# Patient Record
Sex: MALE | Race: WHITE | NOT HISPANIC OR LATINO | Employment: OTHER | URBAN - METROPOLITAN AREA
[De-identification: names, ages, dates, MRNs, and addresses within clinical notes are randomized per-mention and may not be internally consistent; named-entity substitution may affect disease eponyms.]

---

## 2024-02-07 ENCOUNTER — OFFICE VISIT (OUTPATIENT)
Dept: OTOLARYNGOLOGY | Facility: CLINIC | Age: 79
End: 2024-02-07
Payer: MEDICARE

## 2024-02-07 VITALS — WEIGHT: 232 LBS | HEIGHT: 75 IN | TEMPERATURE: 96.8 F | BODY MASS INDEX: 28.85 KG/M2

## 2024-02-07 DIAGNOSIS — H61.23 HEARING LOSS OF BOTH EARS DUE TO CERUMEN IMPACTION: ICD-10-CM

## 2024-02-07 DIAGNOSIS — H61.23 BILATERAL IMPACTED CERUMEN: Primary | ICD-10-CM

## 2024-02-07 DIAGNOSIS — H90.3 SENSORINEURAL HEARING LOSS (SNHL) OF BOTH EARS: ICD-10-CM

## 2024-02-07 PROCEDURE — 99214 OFFICE O/P EST MOD 30 MIN: CPT | Performed by: OTOLARYNGOLOGY

## 2024-02-07 PROCEDURE — 1036F TOBACCO NON-USER: CPT | Performed by: OTOLARYNGOLOGY

## 2024-02-07 PROCEDURE — 1159F MED LIST DOCD IN RCRD: CPT | Performed by: OTOLARYNGOLOGY

## 2024-02-07 RX ORDER — ATORVASTATIN CALCIUM 80 MG/1
1 TABLET, FILM COATED ORAL NIGHTLY
COMMUNITY
Start: 2023-12-26

## 2024-02-07 RX ORDER — DORZOLAMIDE HYDROCHLORIDE AND TIMOLOL MALEATE 20; 5 MG/ML; MG/ML
1 SOLUTION/ DROPS OPHTHALMIC 2 TIMES DAILY
COMMUNITY

## 2024-02-07 RX ORDER — ASPIRIN 81 MG/1
81 TABLET ORAL DAILY
COMMUNITY

## 2024-02-07 RX ORDER — METOPROLOL SUCCINATE 25 MG/1
25 TABLET, EXTENDED RELEASE ORAL EVERY 12 HOURS
COMMUNITY
Start: 2023-11-07

## 2024-02-07 RX ORDER — BRIMONIDINE TARTRATE 1.5 MG/ML
SOLUTION/ DROPS OPHTHALMIC
COMMUNITY

## 2024-02-07 RX ORDER — PANTOPRAZOLE SODIUM 20 MG/1
20 TABLET, DELAYED RELEASE ORAL
COMMUNITY
Start: 2023-11-29 | End: 2024-11-28

## 2024-02-07 RX ORDER — LATANOPROST 50 UG/ML
1 SOLUTION/ DROPS OPHTHALMIC
COMMUNITY
Start: 2023-03-07 | End: 2024-03-06

## 2024-02-07 NOTE — PROGRESS NOTES
"Chief Complaint   Patient presents with    Follow-up     LOV 8/23 EAR CLEANING, IRRIGATED      Date of Evaluation: 2/7/2024   ISABELLE Tamayo is a 78 y.o. male is here for semiannual ear cleaning. The ears feel plugged. He ishad open heart surgery/CABG  History: with a history of recurrent cerumen impactions. In February 2019 he had a right craniotomy and evacuation of a subdural hematoma related to anticoagulation therapy. Since then he has had left-sided pulsatile tinnitus and hearing loss. His previous audiogram shows mild asymmetric moderate high-frequency sensorineural hearing loss       Past Medical History:   Diagnosis Date    Coronary artery disease     Personal history of other diseases of the circulatory system     History of hypertension    Personal history of other diseases of the digestive system     History of gastroesophageal reflux (GERD)    Personal history of other diseases of the respiratory system     History of asthma      Past Surgical History:   Procedure Laterality Date    CARDIAC SURGERY      OTHER SURGICAL HISTORY  01/21/2021    Cataract surgery    OTHER SURGICAL HISTORY  01/21/2021    Craniotomy    OTHER SURGICAL HISTORY  01/21/2021    Knee surgery    OTHER SURGICAL HISTORY  02/02/2022    Rotator cuff repair          Medications:   Current Outpatient Medications   Medication Instructions    aspirin 81 mg, oral, Daily    atorvastatin (Lipitor) 80 mg tablet 1 tablet, oral, Nightly    brimonidine (AlphaGAN P) 0.15 % ophthalmic solution ophthalmic (eye)    dorzolamide-timoloL (Cosopt) 22.3-6.8 mg/mL ophthalmic solution 1 drop, ophthalmic (eye), 2 times daily    latanoprost (Xalatan) 0.005 % ophthalmic solution 1 drop, ophthalmic (eye)    metoprolol succinate XL (TOPROL-XL) 25 mg, oral, Every 12 hours    pantoprazole (PROTONIX) 20 mg, oral        Allergies:  No Known Allergies     Physical Exam:  Last Recorded Vitals  Temperature 36 °C (96.8 °F), height 1.905 m (6' 3\"), weight 105 kg (232 " lb).  []General appearance: Well-developed, well-nourished in no acute distress, conversant with normal voice quality    Head/face: No erythema or edema or facial tenderness, and normal facial nerve function bilaterally    External ear: Clear external auditory canals with normal pinnae bilateral large cerumen impactions obstructing the entire ear canal.  Removed with curettes under microscope  Tube status: N/A  Middle ear: Tympanic membranes intact and mobile, middle ears normal.  Tympanic membrane perforation: N/A  Mastoid bowl: N/A  Hearing: Normal conversational awareness at normal speech thresholds    Nose visualized using: Anterior rhinoscopy  Nasal dorsum: Nontraumatic midline appearance  Septum: Midline, nonobstructing  Inferior turbinates: Normal, pink  Secretions: Dry    Oral cavity and oropharynx: Normal  Teeth: Good condition  Floor of mouth: without lesions  Palate: Normal hard palate, soft palate and uvula  Oropharynx: Clear, no lesions present  Buccal mucosa: Normal without masses or lesions  Lips: Normal    Nasopharynx: Inadequate mirror exam secondary to gag/anatomy    Neck:  Salivary glands: Normal bilateral parotid and submandibular glands by inspection and palpation.  Non-thyroid masses: No palpable masses or significant lymphadenopathy  Trachea: Midline  Thyroid: No thyromegaly or palpable nodules  Temporomandibular joint: Nontender  Cervical range of motion: Normal    Neurologic exam: Alert and oriented x3, appropriate affect.  Cranial nerves II-XII normal bilaterally  Extraocular movement: Extraocular movement intact, normal gaze alignment        Juancarlos was seen today for follow-up.  Diagnoses and all orders for this visit:  Bilateral impacted cerumen (Primary)  Hearing loss of both ears due to cerumen impaction  Sensorineural hearing loss (SNHL) of both ears       PLAN  Recheck in 6 months.  Ears were cleaned and he is hearing better    Germain Frias MD

## 2024-08-02 ENCOUNTER — APPOINTMENT (OUTPATIENT)
Dept: OTOLARYNGOLOGY | Facility: CLINIC | Age: 79
End: 2024-08-02
Payer: MEDICARE

## 2024-08-09 ENCOUNTER — APPOINTMENT (OUTPATIENT)
Dept: OTOLARYNGOLOGY | Facility: CLINIC | Age: 79
End: 2024-08-09
Payer: MEDICARE

## 2024-08-16 ENCOUNTER — APPOINTMENT (OUTPATIENT)
Dept: OTOLARYNGOLOGY | Facility: CLINIC | Age: 79
End: 2024-08-16
Payer: MEDICARE

## 2024-08-16 VITALS — HEIGHT: 75 IN | BODY MASS INDEX: 29.97 KG/M2 | WEIGHT: 241 LBS | TEMPERATURE: 95.9 F

## 2024-08-16 DIAGNOSIS — H90.3 SENSORINEURAL HEARING LOSS (SNHL) OF BOTH EARS: ICD-10-CM

## 2024-08-16 DIAGNOSIS — H61.23 BILATERAL IMPACTED CERUMEN: Primary | ICD-10-CM

## 2024-08-16 DIAGNOSIS — H61.23 HEARING LOSS OF BOTH EARS DUE TO CERUMEN IMPACTION: ICD-10-CM

## 2024-08-16 PROCEDURE — 1036F TOBACCO NON-USER: CPT | Performed by: OTOLARYNGOLOGY

## 2024-08-16 PROCEDURE — 99213 OFFICE O/P EST LOW 20 MIN: CPT | Performed by: OTOLARYNGOLOGY

## 2024-08-16 PROCEDURE — 1159F MED LIST DOCD IN RCRD: CPT | Performed by: OTOLARYNGOLOGY

## 2024-08-16 RX ORDER — ROSUVASTATIN CALCIUM 20 MG/1
20 TABLET, COATED ORAL DAILY
COMMUNITY

## 2024-08-16 NOTE — PROGRESS NOTES
"Chief Complaint   Patient presents with    Follow-up     EP- 6 MO EAR CK, IRRIGATED TODAY       Date of Evaluation: 8/16/2024   HPI  Juancarlos Tamayo \"Uday\" is a 79 y.o. male is here for semiannual ear cleaning. The ears feel plugged. He had open heart surgery/CABG  History: with a history of recurrent cerumen impactions. In February 2019 he had a right craniotomy and evacuation of a subdural hematoma related to anticoagulation therapy. Since then he has had left-sided pulsatile tinnitus and hearing loss. His previous audiogram shows mild asymmetric moderate high-frequency sensorineural hearing loss       Past Medical History:   Diagnosis Date    Coronary artery disease     Personal history of other diseases of the circulatory system     History of hypertension    Personal history of other diseases of the digestive system     History of gastroesophageal reflux (GERD)    Personal history of other diseases of the respiratory system     History of asthma      Past Surgical History:   Procedure Laterality Date    CARDIAC SURGERY      OTHER SURGICAL HISTORY  01/21/2021    Cataract surgery    OTHER SURGICAL HISTORY  01/21/2021    Craniotomy    OTHER SURGICAL HISTORY  01/21/2021    Knee surgery    OTHER SURGICAL HISTORY  02/02/2022    Rotator cuff repair          Medications:   Current Outpatient Medications   Medication Instructions    aspirin 81 mg, oral, Daily    atorvastatin (Lipitor) 80 mg tablet 1 tablet, oral, Nightly    brimonidine (AlphaGAN P) 0.15 % ophthalmic solution ophthalmic (eye)    dorzolamide-timoloL (Cosopt) 22.3-6.8 mg/mL ophthalmic solution 1 drop, ophthalmic (eye), 2 times daily    metoprolol succinate XL (TOPROL-XL) 25 mg, oral, Every 12 hours    pantoprazole (PROTONIX) 20 mg, oral    rosuvastatin (CRESTOR) 20 mg, oral, Daily        Allergies:  Allergies   Allergen Reactions    Clobetasol Hives     Clobetasol propionate        Physical Exam:  Last Recorded Vitals  Temperature 35.5 °C (95.9 °F), height " "1.905 m (6' 3\"), weight 109 kg (241 lb).  []General appearance: Well-developed, well-nourished in no acute distress, conversant with normal voice quality    Head/face: No erythema or edema or facial tenderness, and normal facial nerve function bilaterally    External ear: Clear external auditory canals with normal pinnae bilateral large cerumen impactions obstructing the entire ear canal.  Removed with curettes under microscope  Tube status: N/A  Middle ear: Tympanic membranes intact and mobile, middle ears normal.  Tympanic membrane perforation: N/A  Mastoid bowl: N/A  Hearing: Normal conversational awareness at normal speech thresholds    Nose visualized using: Anterior rhinoscopy  Nasal dorsum: Nontraumatic midline appearance  Septum: Midline, nonobstructing  Inferior turbinates: Normal, pink  Secretions: Dry    Oral cavity and oropharynx: Normal  Teeth: Good condition  Floor of mouth: without lesions  Palate: Normal hard palate, soft palate and uvula  Oropharynx: Clear, no lesions present  Buccal mucosa: Normal without masses or lesions  Lips: Normal    Nasopharynx: Inadequate mirror exam secondary to gag/anatomy    Neck:  Salivary glands: Normal bilateral parotid and submandibular glands by inspection and palpation.  Non-thyroid masses: No palpable masses or significant lymphadenopathy  Trachea: Midline  Thyroid: No thyromegaly or palpable nodules  Temporomandibular joint: Nontender  Cervical range of motion: Normal    Neurologic exam: Alert and oriented x3, appropriate affect.  Cranial nerves II-XII normal bilaterally  Extraocular movement: Extraocular movement intact, normal gaze alignment        Juancarlos \"Uday\" was seen today for follow-up.  Diagnoses and all orders for this visit:  Bilateral impacted cerumen (Primary)  Hearing loss of both ears due to cerumen impaction  Sensorineural hearing loss (SNHL) of both ears         PLAN  Recheck in 6 months.  Ears were cleaned and he is hearing better    Germain J " MD Rodriguez

## 2025-02-17 ENCOUNTER — APPOINTMENT (OUTPATIENT)
Dept: OTOLARYNGOLOGY | Facility: CLINIC | Age: 80
End: 2025-02-17
Payer: MEDICARE

## 2025-02-17 VITALS — TEMPERATURE: 96.1 F | HEIGHT: 75 IN | WEIGHT: 240 LBS | BODY MASS INDEX: 29.84 KG/M2

## 2025-02-17 DIAGNOSIS — H90.3 SENSORINEURAL HEARING LOSS (SNHL) OF BOTH EARS: ICD-10-CM

## 2025-02-17 DIAGNOSIS — H61.23 BILATERAL IMPACTED CERUMEN: Primary | ICD-10-CM

## 2025-02-17 DIAGNOSIS — H61.23 HEARING LOSS OF BOTH EARS DUE TO CERUMEN IMPACTION: ICD-10-CM

## 2025-02-17 PROCEDURE — 1036F TOBACCO NON-USER: CPT | Performed by: OTOLARYNGOLOGY

## 2025-02-17 PROCEDURE — 1159F MED LIST DOCD IN RCRD: CPT | Performed by: OTOLARYNGOLOGY

## 2025-02-17 PROCEDURE — 99213 OFFICE O/P EST LOW 20 MIN: CPT | Performed by: OTOLARYNGOLOGY

## 2025-02-17 RX ORDER — METFORMIN HYDROCHLORIDE 500 MG/1
1000 TABLET, EXTENDED RELEASE ORAL
COMMUNITY

## 2025-02-17 RX ORDER — VIT C/E/ZN/COPPR/LUTEIN/ZEAXAN 250MG-90MG
2000 CAPSULE ORAL
COMMUNITY

## 2025-02-17 RX ORDER — LATANOPROST 50 UG/ML
1 SOLUTION/ DROPS OPHTHALMIC
COMMUNITY
Start: 2024-04-18 | End: 2025-04-18

## 2025-02-17 NOTE — PROGRESS NOTES
"Chief Complaint   Patient presents with    Follow-up     LOV 8/24 EAR CLEANING      Date of Evaluation: 2/17/2025   [facility]  Juancarlos Tamayo \"Uday\" is a 79 y.o. male is here for semiannual ear cleaning. The ears feel plugged. He had open heart surgery/CABG  History: with a history of recurrent cerumen impactions. In February 2019 he had a right craniotomy and evacuation of a subdural hematoma related to anticoagulation therapy. Since then he has had left-sided pulsatile tinnitus and hearing loss. His previous audiogram shows mild asymmetric moderate high-frequency sensorineural hearing loss       Past Medical History:   Diagnosis Date    Coronary artery disease     Personal history of other diseases of the circulatory system     History of hypertension    Personal history of other diseases of the digestive system     History of gastroesophageal reflux (GERD)    Personal history of other diseases of the respiratory system     History of asthma      Past Surgical History:   Procedure Laterality Date    CARDIAC SURGERY      OTHER SURGICAL HISTORY  01/21/2021    Cataract surgery    OTHER SURGICAL HISTORY  01/21/2021    Craniotomy    OTHER SURGICAL HISTORY  01/21/2021    Knee surgery    OTHER SURGICAL HISTORY  02/02/2022    Rotator cuff repair          Medications:   Current Outpatient Medications   Medication Instructions    aspirin 81 mg, Daily    atorvastatin (Lipitor) 80 mg tablet 1 tablet, Nightly    brimonidine (AlphaGAN P) 0.15 % ophthalmic solution Administer into affected eye(s).    cholecalciferol (VITAMIN D-3) 2,000 Units, Daily RT    dorzolamide-timoloL (Cosopt) 22.3-6.8 mg/mL ophthalmic solution 1 drop, 2 times daily    latanoprost (Xalatan) 0.005 % ophthalmic solution 1 drop    metFORMIN XR (GLUCOPHAGE-XR) 1,000 mg, Daily with breakfast    metoprolol succinate XL (TOPROL-XL) 25 mg, Every 12 hours    pantoprazole (PROTONIX) 20 mg, oral    rosuvastatin (CRESTOR) 20 mg, Daily        Allergies:  Allergies   Allergen " "Reactions    Clobetasol Hives     Clobetasol propionate        Physical Exam:  Last Recorded Vitals  Temperature 35.6 °C (96.1 °F), height 1.905 m (6' 3\"), weight 109 kg (240 lb).  []General appearance: Well-developed, well-nourished in no acute distress, conversant with normal voice quality    Head/face: No erythema or edema or facial tenderness, and normal facial nerve function bilaterally    External ear: Clear external auditory canals with normal pinnae bilateral large cerumen impactions obstructing the entire ear canal.  Removed with curettes under microscope  Tube status: N/A  Middle ear: Tympanic membranes intact and mobile, middle ears normal.  Tympanic membrane perforation: N/A  Mastoid bowl: N/A  Hearing: Normal conversational awareness at normal speech thresholds    Nose visualized using: Anterior rhinoscopy  Nasal dorsum: Nontraumatic midline appearance  Septum: Midline, nonobstructing  Inferior turbinates: Normal, pink  Secretions: Dry    Oral cavity and oropharynx: Normal  Teeth: Good condition  Floor of mouth: without lesions  Palate: Normal hard palate, soft palate and uvula  Oropharynx: Clear, no lesions present  Buccal mucosa: Normal without masses or lesions  Lips: Normal    Nasopharynx: Inadequate mirror exam secondary to gag/anatomy    Neck:  Salivary glands: Normal bilateral parotid and submandibular glands by inspection and palpation.  Non-thyroid masses: No palpable masses or significant lymphadenopathy  Trachea: Midline  Thyroid: No thyromegaly or palpable nodules  Temporomandibular joint: Nontender  Cervical range of motion: Normal    Neurologic exam: Alert and oriented x3, appropriate affect.  Cranial nerves II-XII normal bilaterally  Extraocular movement: Extraocular movement intact, normal gaze alignment        There are no diagnoses linked to this encounter.         PLAN  Recheck in 6 months.  Ears were cleaned and he is hearing better    Germain Frias MD    "

## 2025-08-18 ENCOUNTER — APPOINTMENT (OUTPATIENT)
Dept: OTOLARYNGOLOGY | Facility: CLINIC | Age: 80
End: 2025-08-18
Payer: MEDICARE

## 2025-08-22 ENCOUNTER — APPOINTMENT (OUTPATIENT)
Dept: OTOLARYNGOLOGY | Facility: CLINIC | Age: 80
End: 2025-08-22
Payer: MEDICARE

## 2025-08-22 VITALS — TEMPERATURE: 94.6 F | WEIGHT: 236 LBS | BODY MASS INDEX: 29.34 KG/M2 | HEIGHT: 75 IN

## 2025-08-22 DIAGNOSIS — H61.23 BILATERAL IMPACTED CERUMEN: Primary | ICD-10-CM

## 2025-08-22 DIAGNOSIS — H61.23 HEARING LOSS OF BOTH EARS DUE TO CERUMEN IMPACTION: ICD-10-CM

## 2025-08-22 DIAGNOSIS — H90.3 SENSORINEURAL HEARING LOSS (SNHL) OF BOTH EARS: ICD-10-CM

## 2025-08-22 PROCEDURE — 99213 OFFICE O/P EST LOW 20 MIN: CPT | Performed by: OTOLARYNGOLOGY

## 2025-08-22 PROCEDURE — 69210 REMOVE IMPACTED EAR WAX UNI: CPT | Performed by: OTOLARYNGOLOGY

## 2025-08-22 PROCEDURE — 1159F MED LIST DOCD IN RCRD: CPT | Performed by: OTOLARYNGOLOGY

## 2025-08-22 PROCEDURE — 1036F TOBACCO NON-USER: CPT | Performed by: OTOLARYNGOLOGY

## 2025-08-22 RX ORDER — LATANOPROST 50 UG/ML
1 SOLUTION/ DROPS OPHTHALMIC
COMMUNITY
Start: 2025-02-11 | End: 2026-02-11

## 2026-02-27 ENCOUNTER — APPOINTMENT (OUTPATIENT)
Dept: OTOLARYNGOLOGY | Facility: CLINIC | Age: 81
End: 2026-02-27
Payer: MEDICARE